# Patient Record
(demographics unavailable — no encounter records)

---

## 2025-06-09 NOTE — IMAGING
[de-identified] : RIGHT KNEE Inspection:  mild effusion Palpation: lateral and medial joint line tenderness, anterior tenderness Knee Range of Motion:  3-125  Strength: 5/5 Quadriceps strength, 5/5 Hamstring strength Neurological: light touch is intact throughout Ligament Stability and Special Tests:  McMurrays: neg Lachman: neg Pivot Shift: neg Posterior Drawer: neg Valgus: neg Varus: neg Patella Apprehension: neg Patella Maltracking: neg

## 2025-06-09 NOTE — ASSESSMENT
[FreeTextEntry1] : Right X-Ray Examination of the KNEE (4 views): severe lateral and patellofemoral degenerate changes.   - We discussed their diagnosis and treatment options at length including the risks and benefits of both surgical treatment with a knee replacement and non-surgical options. - Due to risks of surgery, we will continue conservative treatment with PT, icing, and anti-inflammatory medications - The patient was provided with a PT prescription to work on ROM, hip ER/abductors strengthening, quad/hamstring stretches and strengthening, and other exercises - The patient was advised to let pain guide the gradual advancement of activities. - We also discussed the possible of a corticosteroid injection in order to help decrease inflammation and pain so that they can perform better therapy. - The risks, benefits, and alternatives to corticosteroid injection were reviewed with the patient and they wished to proceed with this treatment course. - Follow up as needed in 6 weeks to re-evaluate, if no improvement we spoke about possibility of viscosupplementation injections  (may eventually need TKA)   Medication Discussion: 1) We discussed a comprehensive treatment plan that included possible pharmaceutical management involving the use of prescription strength medications including but not limited to options such as oral Naprosyn 500mg BID, once daily Meloxicam 15 mg, or 500-650 mg Tylenol versus over the counter oral medications in addition to discussing possible topical prescription Pennsaid vs  Voltaren gel. 2) There is a moderate risk of morbidity with further treatment, especially from use of prescription strength medications and possible side effects of these medications which include but are not limited to upset stomach with oral medications, skin reactions to topical medications and GI/cardiac/renal issues with long term use. 3) I recommended that the patient follow-up with their medical physician if there are any significant potential issues with long term medication use such as interactions with current medications or with exacerbation of underlying medical comorbidities. 4) The benefits and risks associated with use of oral and / or topical prescription and over the counter anti-inflammatory medications were discussed with the patient. The patient voiced understanding of the risks including but not limited to bleeding, stroke, kidney dysfunction, heart disease, and were referred to the black box warning label for further information.

## 2025-06-09 NOTE — HISTORY OF PRESENT ILLNESS
[de-identified] : 59 year old female  (RHD, , , pickleball) chronic right knee pain worsened since 2022 The pain is located anterior, lateral The pain is associated with stiffness, clicking.  Worse with activity and better at rest. Has tried ice, Voltaren H/O CSI (12/31/2020)  4/17/23- had CSI ( 7/28) with relief until feb 2023 worsening and with activity 5/18/23 - had CSI (4/17) with temp relief. Here with returning knee pain and to discuss poss visco injections. 5/25/23 - Right knee visco #2 6/1/23- Right knee visco #3 4/4/24- pain returns in rt knee, went to PT has good relief from visco but now worsening since March 2024 6/9/25- pain returned since may 2025 wors2e with activtiy